# Patient Record
Sex: MALE | Race: OTHER | Employment: UNEMPLOYED | URBAN - METROPOLITAN AREA
[De-identification: names, ages, dates, MRNs, and addresses within clinical notes are randomized per-mention and may not be internally consistent; named-entity substitution may affect disease eponyms.]

---

## 2017-03-06 ENCOUNTER — APPOINTMENT (OUTPATIENT)
Dept: CT IMAGING | Age: 32
End: 2017-03-06
Attending: EMERGENCY MEDICINE
Payer: SELF-PAY

## 2017-03-06 ENCOUNTER — HOSPITAL ENCOUNTER (EMERGENCY)
Age: 32
Discharge: HOME | End: 2017-03-06
Attending: STUDENT IN AN ORGANIZED HEALTH CARE EDUCATION/TRAINING PROGRAM | Admitting: STUDENT IN AN ORGANIZED HEALTH CARE EDUCATION/TRAINING PROGRAM
Payer: SELF-PAY

## 2017-03-06 VITALS
WEIGHT: 150.13 LBS | HEART RATE: 68 BPM | DIASTOLIC BLOOD PRESSURE: 78 MMHG | TEMPERATURE: 98.6 F | RESPIRATION RATE: 16 BRPM | SYSTOLIC BLOOD PRESSURE: 140 MMHG | HEIGHT: 67 IN | OXYGEN SATURATION: 98 % | BODY MASS INDEX: 23.56 KG/M2

## 2017-03-06 DIAGNOSIS — J02.9 SORE THROAT: Primary | ICD-10-CM

## 2017-03-06 LAB
ANION GAP BLD CALC-SCNC: 14 MMOL/L (ref 5–15)
BUN BLD-MCNC: 23 MG/DL (ref 9–20)
CA-I BLD-MCNC: 1.21 MMOL/L (ref 1.12–1.32)
CHLORIDE BLD-SCNC: 105 MMOL/L (ref 98–107)
CO2 BLD-SCNC: 29 MMOL/L (ref 21–32)
CREAT BLD-MCNC: 0.6 MG/DL (ref 0.6–1.3)
GLUCOSE BLD-MCNC: 105 MG/DL (ref 75–110)
HCT VFR BLD CALC: 46 % (ref 36.6–50.3)
HGB BLD-MCNC: 15.6 GM/DL (ref 12.1–17)
POTASSIUM BLD-SCNC: 4 MMOL/L (ref 3.5–5.1)
S PYO AG THROAT QL: NEGATIVE
SERVICE CMNT-IMP: ABNORMAL
SODIUM BLD-SCNC: 143 MMOL/L (ref 136–145)

## 2017-03-06 PROCEDURE — 70491 CT SOFT TISSUE NECK W/DYE: CPT

## 2017-03-06 PROCEDURE — 87880 STREP A ASSAY W/OPTIC: CPT

## 2017-03-06 PROCEDURE — 96374 THER/PROPH/DIAG INJ IV PUSH: CPT

## 2017-03-06 PROCEDURE — 87070 CULTURE OTHR SPECIMN AEROBIC: CPT | Performed by: STUDENT IN AN ORGANIZED HEALTH CARE EDUCATION/TRAINING PROGRAM

## 2017-03-06 PROCEDURE — 74011250636 HC RX REV CODE- 250/636: Performed by: EMERGENCY MEDICINE

## 2017-03-06 PROCEDURE — 74011000258 HC RX REV CODE- 258: Performed by: STUDENT IN AN ORGANIZED HEALTH CARE EDUCATION/TRAINING PROGRAM

## 2017-03-06 PROCEDURE — 74011636320 HC RX REV CODE- 636/320: Performed by: STUDENT IN AN ORGANIZED HEALTH CARE EDUCATION/TRAINING PROGRAM

## 2017-03-06 PROCEDURE — 96361 HYDRATE IV INFUSION ADD-ON: CPT

## 2017-03-06 PROCEDURE — 80047 BASIC METABLC PNL IONIZED CA: CPT

## 2017-03-06 PROCEDURE — 99283 EMERGENCY DEPT VISIT LOW MDM: CPT

## 2017-03-06 RX ORDER — DEXAMETHASONE SODIUM PHOSPHATE 4 MG/ML
10 INJECTION, SOLUTION INTRA-ARTICULAR; INTRALESIONAL; INTRAMUSCULAR; INTRAVENOUS; SOFT TISSUE
Status: COMPLETED | OUTPATIENT
Start: 2017-03-06 | End: 2017-03-06

## 2017-03-06 RX ORDER — SODIUM CHLORIDE 0.9 % (FLUSH) 0.9 %
10 SYRINGE (ML) INJECTION
Status: COMPLETED | OUTPATIENT
Start: 2017-03-06 | End: 2017-03-06

## 2017-03-06 RX ORDER — IBUPROFEN 600 MG/1
600 TABLET ORAL
Qty: 20 TAB | Refills: 0 | Status: SHIPPED | OUTPATIENT
Start: 2017-03-06

## 2017-03-06 RX ADMIN — SODIUM CHLORIDE 100 ML: 900 INJECTION, SOLUTION INTRAVENOUS at 22:27

## 2017-03-06 RX ADMIN — Medication 10 ML: at 22:27

## 2017-03-06 RX ADMIN — DEXAMETHASONE SODIUM PHOSPHATE 10 MG: 4 INJECTION, SOLUTION INTRAMUSCULAR; INTRAVENOUS at 21:48

## 2017-03-06 RX ADMIN — SODIUM CHLORIDE 1000 ML: 900 INJECTION, SOLUTION INTRAVENOUS at 21:48

## 2017-03-06 RX ADMIN — IOPAMIDOL 100 ML: 612 INJECTION, SOLUTION INTRAVENOUS at 22:27

## 2017-03-07 NOTE — DISCHARGE INSTRUCTIONS
We hope that we have addressed all of your medical concerns. The examination and treatment you received in the Emergency Department were for an emergent problem and were not intended as complete care. It is important that you follow up with your healthcare provider(s) for ongoing care. If your symptoms worsen or do not improve as expected, and you are unable to reach your usual health care provider(s), you should return to the Emergency Department. Today's healthcare is undergoing tremendous change, and patient satisfaction surveys are one of the many tools to assess the quality of medical care. You may receive a survey from the [facility] regarding your experience in the Emergency Department. I hope that your experience has been completely positive, particularly the medical care that I provided. As such, please participate in the survey; anything less than excellent does not meet my expectations or intentions. [facility] and [address] [place] [address] participate in nationally recognized quality of care measures. If your blood pressure is greater than 120/80, as reported below, we urge that you seek medical care to address the potential of high blood pressure, commonly known as hypertension. Hypertension can be hereditary or can be caused by certain medical conditions, pain, stress, or \"white coat syndrome. \"       Please make an appointment with your health care provider(s) for follow up of your Emergency Department visit. VITALS:   Patient Vitals for the past 8 hrs:   Temp Pulse Resp BP SpO2   03/06/17 2042 98.5 °F (36.9 °C) 66 16 141/82 97 %          Thank you for allowing us to provide you with medical care today. We realize that you have many choices for your emergency care needs. Please choose us in the future for any continued health care needs. Indira Schwartz [facility], [address] [place] [address] [address]   Office: [phone]            Recent Results (from the past 24 hour(s))   POC GROUP A STREP    Collection Time: 03/06/17  8:59 PM   Result Value Ref Range    Group A strep (POC) NEGATIVE  NEG     POC CHEM8    Collection Time: 03/06/17  9:47 PM   Result Value Ref Range    Calcium, ionized (POC) 1.21 1.12 - 1.32 MMOL/L    Sodium (POC) 143 136 - 145 MMOL/L    Potassium (POC) 4.0 3.5 - 5.1 MMOL/L    Chloride (POC) 105 98 - 107 MMOL/L    CO2 (POC) 29 21 - 32 MMOL/L    Anion gap (POC) 14 5 - 15 mmol/L    Glucose (POC) 105 75 - 110 MG/DL    BUN (POC) 23 (H) 9 - 20 MG/DL    Creatinine (POC) 0.6 0.6 - 1.3 MG/DL    GFR-AA (POC) >60 >60 ml/min/1.73m2    GFR, non-AA (POC) >60 >60 ml/min/1.73m2    Hemoglobin (POC) 15.6 12.1 - 17.0 GM/DL    Hematocrit (POC) 46 36.6 - 50.3 %    Comment Comment Not Indicated. Ct Neck Soft Tissue W Cont    Result Date: 3/6/2017  EXAM:  CT NECK SOFT TISSUE W CONT INDICATION:  Throat pain for 2 weeks. COMPARISON:  None TECHNIQUE: Helical neck CT is performed with intravenous contrast. Coronal and sagittal reformatted images are obtained. CT dose reduction was achieved through use of a standardized protocol tailored for this examination and automatic exposure control for dose modulation. Adaptive statistical iterative reconstruction (ASIR) was utilized. FINDINGS: The visualized paranasal sinuses are clear. The nasopharynx, oropharynx, hypopharynx and larynx are normal.  There is limitation in evaluating the oral cavity, although grossly the oral tongue, buccal spaces and floor of mouth are normal.   The thyroid, submandibular, and parotid glands are normal.   There are scattered nonenlarged lymph nodes in the cervical soft tissues. The visualized lung apices demonstrate mild centrilobular emphysema. Limited intracranial images are grossly normal. The visualized globes and orbits are symmetric and within normal limits. IMPRESSION: No acute abnormality.                [place]: Instrucciones de cuidado - [ Sore Throat: Care Instructions ]  Instrucciones de cuidado    Clive infección por un virus o clive bacteria causa la mayoría de los eren de garganta. El humo del cigarrillo, el aire seco, el aire contaminado, las [employer] y gritar también pueden causar dolor de garganta. El dolor de garganta puede ser intenso y [place]. Por lavon, la mayoría de los eren de garganta desaparecen por sí mismos. Si tiene [employer], el médico podría recetarle antibióticos. La atención de seguimiento es clive parte clave de smyth tratamiento y seguridad. Asegúrese de hacer y acudir a todas las citas, y llame a smyth médico si está teniendo problemas. También es clive buena idea saber los resultados de los exámenes y mantener clive lista de los medicamentos que dominguez. ¿Cómo puede cuidarse en el hogar? · Si smyth médico le recetó antibióticos, tómelos según las indicaciones. No deje de tomarlos por el hecho de sentirse mejor. Debe jen todos los antibióticos hasta terminarlos. · Ryan gárgaras de agua salada tibia clive vez cada hora para ayudar a reducir la hinchazón y aliviar la molestia. Mezcle 1 cucharadita de sal en 1 taza de agua tibia. · [place] un analgésico (medicamento para el dolor) de venta dago, trina acetaminofén (Tylenol), ibuprofeno (Advil, Motrin) o naproxeno (Aleve). Lottie y siga todas las instrucciones de la [place]. · Tenga cuidado cuando tome medicamentos de venta dago para el resfriado o la gripe y Tylenol al [employer]. Muchos de estos medicamentos contienen acetaminofén, o sea, Tylenol. Lottie las etiquetas para asegurarse de que no está tomando clive dosis mayor que la recomendada. El exceso de acetaminofén (Tylenol) puede ser dañino. · [place] abundantes líquidos. Los líquidos podrían ayudar a aliviar la irritación de la garganta. Beber líquidos calientes, trina té o sopa, podría ayudarle a reducir el dolor de garganta.   · Chupe pastillas para la garganta de venta dago para aliviar el dolor. Los caramelos duros o los caramelos regulares para la tos también podrían ayudar. Nunca se los dé a un linn pequeño porque corre el riesgo de atragantarse. · No fume ni permita que otros fumen cerca de usted. Si necesita ayuda para dejar de fumar, hable con smyth médico [employer] y medicamentos para dejar de fumar. Estos pueden aumentar reena probabilidades de dejar el hábito para siempre. · Use un humidificador o vaporizador para añadir humedad en smyth dormitorio. Siga las instrucciones para limpiar el aparato. ¿Cuándo debe pedir ayuda? Llame a smyth médico ahora mismo o busque atención médica inmediata si:  · Tiene dificultades para tragar o [place]. · El dolor de garganta empeora mucho en un lado. Preste especial atención a los cambios en smyth steve y asegúrese de comunicarse con smyth médico si no mejora trina se esperaba. ¿Dónde puede encontrar más información en inglés? Linda Zavaleta a [email or web address]-qamar.[email or web address] [facility] en la búsqueda para aprender más acerca de \"Dolor de garganta: Instrucciones de cuidado - [ Sore Throat: Care Instructions ]. \"  Revisado: 29 julio, 2016  Versión del contenido: 11.1  © 6766-0142 [employer]wise, Incorporated. Las instrucciones de cuidado fueron adaptadas bajo licencia por [employer] (which disclaims liability or warranty for this information). Si usted tiene [place] afección médica o sobre estas instrucciones, siempre pregunte a smyth profesional de setve. Healthwise, Incorporated niega toda garantía o responsabilidad por smyth uso de esta información.

## 2017-03-07 NOTE — ED TRIAGE NOTES
2 weeks had felt like a ball was in his throat. Can swallow; hurts a little to swallow. Has had a fever over the last 2 weeks.

## 2017-03-07 NOTE — ED PROVIDER NOTES
HPI Comments: This 27-year-old male presents to the emergency department for evaluation of sore throat and throat discomfort. Patient states this has been present for the past 2 weeks. Patient states he had the sensation of a ball in his throat. The pain is mild. No difficulty swallowing. No drooling. No fevers or chills. No cough, congestion runny nose. No abdominal pain, nausea vomiting. No muscle aches body aches. No neck pain or headache. Patient is not taking anything for his symptoms. No alleviating or aggravating factors. Patient is a 27 y.o. male presenting with sore throat. The history is provided by the patient. Sore Throat    Pertinent negatives include no vomiting, no congestion, no headaches, no shortness of breath and no cough. History reviewed. No pertinent past medical history. Past Surgical History:   Procedure Laterality Date    HX APPENDECTOMY           History reviewed. No pertinent family history. Social History     Social History    Marital status: SINGLE     Spouse name: N/A    Number of children: N/A    Years of education: N/A     Occupational History    Not on file. Social History Main Topics    Smoking status: Current Every Day Smoker     Packs/day: 1.00    Smokeless tobacco: Not on file    Alcohol use Yes      Comment: socially    Drug use: No    Sexual activity: Not on file     Other Topics Concern    Not on file     Social History Narrative    No narrative on file         ALLERGIES: Pcn [penicillins]    Review of Systems   Constitutional: Negative for chills and fever. HENT: Positive for sore throat. Negative for congestion, dental problem and postnasal drip. Respiratory: Negative for cough, chest tightness and shortness of breath. Gastrointestinal: Negative for abdominal pain, nausea and vomiting. Musculoskeletal: Negative for back pain, neck pain and neck stiffness. Skin: Negative for color change and rash.    Neurological: Negative for dizziness, weakness, light-headedness and headaches. All other systems reviewed and are negative. Vitals:    03/06/17 2042   BP: 141/82   Pulse: 66   Resp: 16   Temp: 98.5 °F (36.9 °C)   SpO2: 97%   Weight: 68.1 kg (150 lb 2 oz)   Height: 5' 7\" (1.702 m)            Physical Exam   Constitutional: He is oriented to person, place, and time. He appears well-developed and well-nourished. HENT:   Head: Normocephalic and atraumatic. Right Ear: External ear normal.   Left Ear: External ear normal.   Nose: Nose normal.   Mouth/Throat: Oropharynx is clear and moist. No oropharyngeal exudate. UVULA MIDLINE, NO TRISMUS, NO DROOLING, NO SUBMANDIBULAR SWELLING, NO TONSILLAR HYPERTROPHY OR EXUDATES, NO MASTOID TENDERNESS, ERYTHEMA TO POSTERIOR PHAYRNX. PT TOLERATING SECRETIONS. PT ABLE TO SWALLOW WITHOUT PROBLEM. Eyes: Conjunctivae and EOM are normal. Pupils are equal, round, and reactive to light. Neck: Normal range of motion. Neck supple. Cardiovascular: Normal rate, regular rhythm and normal heart sounds. Pulmonary/Chest: Effort normal and breath sounds normal. No respiratory distress. He has no wheezes. Musculoskeletal: Normal range of motion. Lymphadenopathy:     He has no cervical adenopathy. Neurological: He is alert and oriented to person, place, and time. He displays normal reflexes. No cranial nerve deficit. He exhibits normal muscle tone. Coordination normal.   Skin: Skin is warm and dry. No rash noted. Psychiatric: He has a normal mood and affect. His behavior is normal. Judgment and thought content normal.        MDM  Number of Diagnoses or Management Options  Sore throat:   Diagnosis management comments: 50-year-old male presenting for throat discomfort. He is nontoxic-appearing. There is no tonsillar exudates or drooling. Uvula is midline. No signs of peritonsillar abscess. Patient has no difficulty swallowing. Patient reporting a ball sensation in his throat.   Plan: Rapid strep and CT    Patient is well hydrated, well appearing, and in no respiratory distress. Physical exam is reassuring, and without signs of serious illness. Rapid strep negative. No trismus, stridor or increased work of breathing associated with sore throat. Differential diagnosis includes viral pharyngitis, mononucleosis, strep pharyngitis with negative POC strep. Will discharge with supportive care pending throat culture. Patient's results have been reviewed with them. Patient and/or family have verbally conveyed their understanding and agreement of the patient's signs, symptoms, diagnosis, treatment and prognosis and additionally agree to follow up as recommended or return to the Emergency Room should their condition change prior to follow-up. Discharge instructions have also been provided to the patient with some educational information regarding their diagnosis as well a list of reasons why they would want to return to the ER prior to their follow-up appointment should their condition change. Amount and/or Complexity of Data Reviewed  Discuss the patient with other providers: yes (ER attending-[place])    Patient Progress  Patient progress: stable    ED Course       Procedures         Pt case including HPI, PE, and all available lab and radiology results has been discussed with attending physician. Opportunity to evaluate patient has been provided to ER attending. Discharge and prescription plan has been agreed upon.

## 2017-03-08 LAB
BACTERIA SPEC CULT: NORMAL
SERVICE CMNT-IMP: NORMAL